# Patient Record
Sex: MALE | Race: WHITE | NOT HISPANIC OR LATINO | Employment: OTHER | URBAN - METROPOLITAN AREA
[De-identification: names, ages, dates, MRNs, and addresses within clinical notes are randomized per-mention and may not be internally consistent; named-entity substitution may affect disease eponyms.]

---

## 2021-01-08 ENCOUNTER — TELEPHONE (OUTPATIENT)
Dept: SCHEDULING | Facility: IMAGING CENTER | Age: 46
End: 2021-01-08

## 2021-01-25 ENCOUNTER — TELEPHONE (OUTPATIENT)
Dept: MEDICAL GROUP | Facility: PHYSICIAN GROUP | Age: 46
End: 2021-01-25

## 2021-01-25 NOTE — TELEPHONE ENCOUNTER
Patient LVM asking if [facility] for [facility].  He is requesting to discuss [facility] through [facility].    Phone Number Called: [phone]    Call outcome: Spoke to patient regarding message below.    Message: I spoke with patient and provided the following options:     1.  Bring copy of imaging reports to discuss & we can scan to chart during appt.    2. Present to our office for signature so I can request records on his behalf.    3. Fax # provided so patient can request last MD to fax our office records.    Patient decided to sign up for [employer] and send me a message once activated so I may send him ANDREY and he will upload signed copy. If he is unable to complete this way he will call me back to go over other options as outlined above.

## 2021-02-10 NOTE — TELEPHONE ENCOUNTER
Patient called today requesting ANDREY - I have uploaded a blank ANDREY to [employer] and sent a test message to him so he can upload ANDREY back to me if he has trouble with uploading signed ANDREY he will drop copy off at office for me to obtain records prior to appt.

## 2021-02-11 ENCOUNTER — TELEPHONE (OUTPATIENT)
Dept: MEDICAL GROUP | Facility: PHYSICIAN GROUP | Age: 46
End: 2021-02-11

## 2021-02-11 RX ORDER — PANTOPRAZOLE SODIUM 40 MG/1
40 TABLET, DELAYED RELEASE ORAL DAILY
COMMUNITY
End: 2021-02-18

## 2021-02-11 RX ORDER — ATORVASTATIN CALCIUM 40 MG/1
40 TABLET, FILM COATED ORAL NIGHTLY
COMMUNITY
End: 2021-02-18

## 2021-02-11 RX ORDER — METOPROLOL SUCCINATE 50 MG/1
50 TABLET, EXTENDED RELEASE ORAL DAILY
COMMUNITY
End: 2021-02-18

## 2021-02-11 NOTE — TELEPHONE ENCOUNTER
Calorie Counting for Weight Loss  Calories are units of energy. Your body needs a certain amount of calories from food to keep you going throughout the day. When you eat more calories than your body needs, your body stores the extra calories as fat. When you eat fewer calories than your body needs, your body burns fat to get the energy it needs.  Calorie counting means keeping track of how many calories you eat and drink each day. Calorie counting can be helpful if you need to lose weight. If you make sure to eat fewer calories than your body needs, you should lose weight. Ask your health care provider what a healthy weight is for you.  For calorie counting to work, you will need to eat the right number of calories in a day in order to lose a healthy amount of weight per week. A dietitian can help you determine how many calories you need in a day and will give you suggestions on how to reach your calorie goal.  · A healthy amount of weight to lose per week is usually 1-2 lb (0.5-0.9 kg). This usually means that your daily calorie intake should be reduced by 500-750 calories.  · Eating 1,200 - 1,500 calories per day can help most women lose weight.  · Eating 1,500 - 1,800 calories per day can help most men lose weight.  What is my plan?  My goal is to have __________ calories per day.  If I have this many calories per day, I should lose around __________ pounds per week.  What do I need to know about calorie counting?  In order to meet your daily calorie goal, you will need to:  · Find out how many calories are in each food you would like to eat. Try to do this before you eat.  · Decide how much of the food you plan to eat.  · Write down what you ate and how many calories it had. Doing this is called keeping a food log.  To successfully lose weight, it is important to balance calorie counting with a healthy lifestyle that includes regular activity. Aim for 150 minutes of moderate exercise (such as walking) or 75  Left message for patient to call back regarding pre-visit planning. Please transfer call to [phone].       minutes of vigorous exercise (such as running) each week.  Where do I find calorie information?    The number of calories in a food can be found on a Nutrition Facts label. If a food does not have a Nutrition Facts label, try to look up the calories online or ask your dietitian for help.  Remember that calories are listed per serving. If you choose to have more than one serving of a food, you will have to multiply the calories per serving by the amount of servings you plan to eat. For example, the label on a package of bread might say that a serving size is 1 slice and that there are 90 calories in a serving. If you eat 1 slice, you will have eaten 90 calories. If you eat 2 slices, you will have eaten 180 calories.  How do I keep a food log?  Immediately after each meal, record the following information in your food log:  · What you ate. Don't forget to include toppings, sauces, and other extras on the food.  · How much you ate. This can be measured in cups, ounces, or number of items.  · How many calories each food and drink had.  · The total number of calories in the meal.  Keep your food log near you, such as in a small notebook in your pocket, or use a mobile [employer]iso or website. Some programs will calculate calories for you and show you how many calories you have left for the day to meet your goal.  What are some calorie counting tips?    · Use your calories on foods and drinks that will fill you up and not leave you hungry:  ? Some examples of foods that fill you up are nuts and nut butters, vegetables, lean proteins, and high-fiber foods like whole grains. High-fiber foods are foods with more than 5 g fiber per serving.  ? Drinks such as sodas, specialty coffee drinks, alcohol, and juices have a lot of calories, yet do not fill you up.  · Eat nutritious foods and avoid empty calories. Empty calories are calories you get from foods or beverages that do not have many vitamins or protein, such as candy, sweets, and  "soda. It is better to have a nutritious high-calorie food (such as an avocado) than a food with few nutrients (such as a bag of chips).  · Know how many calories are in the foods you eat most often. This will help you calculate calorie counts faster.  · Pay attention to calories in drinks. Low-calorie drinks include water and unsweetened drinks.  · Pay attention to nutrition labels for \"low fat\" or \"fat free\" foods. These foods sometimes have the same amount of calories or more calories than the full fat versions. They also often have added sugar, starch, or salt, to make up for flavor that was removed with the fat.  · Find a way of tracking calories that works for you. Get creative. Try different apps or programs if writing down calories does not work for you.  What are some portion control tips?  · Know how many calories are in a serving. This will help you know how many servings of a certain food you can have.  · Use a measuring cup to measure serving sizes. You could also try weighing out portions on a kitchen scale. With time, you will be able to estimate serving sizes for some foods.  · Take some time to put servings of different foods on your favorite plates, bowls, and cups so you know what a serving looks like.  · Try not to eat straight from a bag or box. Doing this can lead to overeating. Put the amount you would like to eat in a cup or on a plate to make sure you are eating the right portion.  · Use smaller plates, glasses, and bowls to prevent overeating.  · Try not to multitask (for example, watch TV or use your computer) while eating. If it is time to eat, sit down at a table and enjoy your food. This will help you to know when you are full. It will also help you to be aware of what you are eating and how much you are eating.  What are tips for following this plan?  Reading food labels  · Check the calorie count compared to the serving size. The serving size may be smaller than what you are used to " eating.  · Check the source of the calories. Make sure the food you are eating is high in vitamins and protein and low in saturated and trans fats.  Shopping  · Read nutrition labels while you shop. This will help you make healthy decisions before you decide to purchase your food.  · Make a grocery list and stick to it.  Cooking  · Try to cook your favorite foods in a healthier way. For example, try baking instead of frying.  · Use low-fat dairy products.  Meal planning  · Use more fruits and vegetables. Half of your plate should be fruits and vegetables.  · Include lean proteins like poultry and fish.  How do I count calories when eating out?  · Ask for smaller portion sizes.  · Consider sharing an entree and sides instead of getting your own entree.  · If you get your own entree, eat only half. Ask for a box at the beginning of your meal and put the rest of your entree in it so you are not tempted to eat it.  · If calories are listed on the menu, choose the lower calorie options.  · Choose dishes that include vegetables, fruits, whole grains, low-fat dairy products, and lean protein.  · Choose items that are boiled, broiled, grilled, or steamed. Stay away from items that are buttered, battered, fried, or served with cream sauce. Items labeled “crispy” are usually fried, unless stated otherwise.  · Choose water, low-fat milk, unsweetened iced tea, or other drinks without added sugar. If you want an alcoholic beverage, choose a lower calorie option such as a glass of wine or light beer.  · Ask for dressings, sauces, and syrups on the side. These are usually high in calories, so you should limit the amount you eat.  · If you want a salad, choose a garden salad and ask for grilled meats. Avoid extra toppings like page, cheese, or fried items. Ask for the dressing on the side, or ask for olive oil and vinegar or lemon to use as dressing.  · Estimate how many servings of a food you are given. For example, a serving of  cooked rice is ½ cup or about the size of half a baseball. Knowing serving sizes will help you be aware of how much food you are eating at restaurants. The list below tells you how big or small some common portion sizes are based on everyday objects:  ? 1 oz--4 stacked dice.  ? 3 oz--1 deck of cards.  ? 1 tsp--1 die.  ? 1 Tbsp--½ a ping-pong ball.  ? 2 Tbsp--1 ping-pong ball.  ? ½ cup--½ baseball.  ? 1 cup--1 baseball.  Summary  · Calorie counting means keeping track of how many calories you eat and drink each day. If you eat fewer calories than your body needs, you should lose weight.  · A healthy amount of weight to lose per week is usually 1-2 lb (0.5-0.9 kg). This usually means reducing your daily calorie intake by 500-750 calories.  · The number of calories in a food can be found on a Nutrition Facts label. If a food does not have a Nutrition Facts label, try to look up the calories online or ask your dietitian for help.  · Use your calories on foods and drinks that will fill you up, and not on foods and drinks that will leave you hungry.  · Use smaller plates, glasses, and bowls to prevent overeating.  This information is not intended to replace advice given to you by your health care provider. Make sure you discuss any questions you have with your health care provider.  Document Released: 12/18/2006 Document Revised: 11/17/2017 Document Reviewed: 11/17/2017  [employer] Interactive Patient Education © 2019 [employer]      Exercising to Lose Weight  Exercise is structured, repetitive physical activity to improve fitness and health. Getting regular exercise is important for everyone. It is especially important if you are overweight. Being overweight increases your risk of heart disease, stroke, diabetes, high blood pressure, and several types of cancer. Reducing your calorie intake and exercising can help you lose weight.  Exercise is usually categorized as moderate or vigorous intensity. To lose weight, most  people need to do a certain amount of moderate-intensity or vigorous-intensity exercise each week.  Moderate-intensity exercise    Moderate-intensity exercise is any activity that gets you moving enough to burn at least three times more energy (calories) than if you were sitting.  Examples of moderate exercise include:  · Walking a mile in 15 minutes.  · Doing light yard work.  · Biking at an easy pace.  Most people should get at least 150 minutes (2 hours and 30 minutes) a week of moderate-intensity exercise to maintain their body weight.  Vigorous-intensity exercise  Vigorous-intensity exercise is any activity that gets you moving enough to burn at least six times more calories than if you were sitting. When you exercise at this intensity, you should be working hard enough that you are not able to carry on a conversation.  Examples of vigorous exercise include:  · Running.  · Playing a team sport, such as football, basketball, and soccer.  · Jumping rope.  Most people should get at least 75 minutes (1 hour and 15 minutes) a week of vigorous-intensity exercise to maintain their body weight.  How can exercise affect me?  When you exercise enough to burn more calories than you eat, you lose weight. Exercise also reduces body fat and builds muscle. The more muscle you have, the more calories you burn. Exercise also:  · Improves mood.  · Reduces stress and tension.  · Improves your overall fitness, flexibility, and endurance.  · Increases bone strength.  The amount of exercise you need to lose weight depends on:  · Your age.  · The type of exercise.  · Any health conditions you have.  · Your overall physical ability.  Talk to your health care provider about how much exercise you need and what types of activities are safe for you.  What actions can I take to lose weight?  Nutrition    · Make changes to your diet as told by your health care provider or diet and nutrition specialist (dietitian). This may  include:  ? Eating fewer calories.  ? Eating more protein.  ? Eating less unhealthy fats.  ? Eating a diet that includes fresh fruits and vegetables, whole grains, low-fat dairy products, and lean protein.  ? Avoiding foods with added fat, salt, and sugar.  · Drink plenty of water while you exercise to prevent dehydration or heat stroke.  Activity  · Choose an activity that you enjoy and set realistic goals. Your health care provider can help you make an exercise plan that works for you.  · Exercise at a moderate or vigorous intensity most days of the week.  ? The intensity of exercise may vary from person to person. You can tell how intense a workout is for you by paying attention to your breathing and heartbeat. Most people will notice their breathing and heartbeat get faster with more intense exercise.  · Do resistance training twice each week, such as:  ? Push-ups.  ? Sit-ups.  ? Lifting weights.  ? Using resistance bands.  · Getting short amounts of exercise can be just as helpful as long structured periods of exercise. If you have trouble finding time to exercise, try to include exercise in your daily routine.  ? Get up, stretch, and walk around every 30 minutes throughout the day.  ? Go for a walk during your lunch break.  ? Park your car farther away from your destination.  ? If you take public transportation, get off one stop early and walk the rest of the way.  ? Make phone calls while standing up and walking around.  ? Take the stairs instead of elevators or escalators.  · Wear comfortable clothes and shoes with good support.  · Do not exercise so much that you hurt yourself, feel dizzy, or get very short of breath.  Where to find more information  · U.[place]. Department of Health and Human Services: www.hhs.gov  · Centers for Disease Control and Prevention (CDC): www.cdc.gov  Contact a health care provider:  · Before starting a new exercise program.  · If you have questions or concerns about your  weight.  · If you have a medical problem that keeps you from exercising.  Get help right away if you have any of the following while exercising:  · Injury.  · Dizziness.  · Difficulty breathing or shortness of breath that does not go away when you stop exercising.  · Chest pain.  · Rapid heartbeat.  Summary  · Being overweight increases your risk of heart disease, stroke, diabetes, high blood pressure, and several types of cancer.  · Losing weight happens when you burn more calories than you eat.  · Reducing the amount of calories you eat in addition to getting regular moderate or vigorous exercise each week helps you lose weight.  This information is not intended to replace advice given to you by your health care provider. Make sure you discuss any questions you have with your health care provider.  Document Released: 01/20/2012 Document Revised: 12/31/2018 Document Reviewed: 12/31/2018  [employer] Interactive Patient Education © 2019 [employer]

## 2021-02-16 NOTE — TELEPHONE ENCOUNTER
Left message for patient to call back regarding pre-visit planning. Please transfer call to [phone].

## 2021-02-18 ENCOUNTER — OFFICE VISIT (OUTPATIENT)
Dept: MEDICAL GROUP | Facility: PHYSICIAN GROUP | Age: 46
End: 2021-02-18
Payer: OTHER GOVERNMENT

## 2021-02-18 VITALS
SYSTOLIC BLOOD PRESSURE: 112 MMHG | BODY MASS INDEX: 29.06 KG/M2 | TEMPERATURE: 98.1 F | HEIGHT: 70 IN | HEART RATE: 63 BPM | WEIGHT: 203 LBS | DIASTOLIC BLOOD PRESSURE: 80 MMHG | OXYGEN SATURATION: 97 % | RESPIRATION RATE: 16 BRPM

## 2021-02-18 DIAGNOSIS — R35.1 BENIGN PROSTATIC HYPERPLASIA WITH NOCTURIA: ICD-10-CM

## 2021-02-18 DIAGNOSIS — G47.33 OBSTRUCTIVE SLEEP APNEA SYNDROME: ICD-10-CM

## 2021-02-18 DIAGNOSIS — G89.29 CHRONIC PAIN OF RIGHT KNEE: ICD-10-CM

## 2021-02-18 DIAGNOSIS — M25.561 CHRONIC PAIN OF RIGHT KNEE: ICD-10-CM

## 2021-02-18 DIAGNOSIS — K58.0 IRRITABLE BOWEL SYNDROME WITH DIARRHEA: ICD-10-CM

## 2021-02-18 DIAGNOSIS — N40.1 BENIGN PROSTATIC HYPERPLASIA WITH NOCTURIA: ICD-10-CM

## 2021-02-18 DIAGNOSIS — L71.9 ROSACEA: ICD-10-CM

## 2021-02-18 DIAGNOSIS — L40.9 PSORIASIS: ICD-10-CM

## 2021-02-18 PROBLEM — K21.9 GERD (GASTROESOPHAGEAL REFLUX DISEASE): Status: RESOLVED | Noted: 2021-02-18 | Resolved: 2021-02-18

## 2021-02-18 PROBLEM — H93.19 TINNITUS: Status: ACTIVE | Noted: 2021-02-18

## 2021-02-18 PROBLEM — M54.50 CHRONIC LOW BACK PAIN: Status: ACTIVE | Noted: 2021-02-18

## 2021-02-18 PROBLEM — N40.0 BPH (BENIGN PROSTATIC HYPERPLASIA): Status: ACTIVE | Noted: 2021-02-18

## 2021-02-18 PROBLEM — S83.249A ACUTE MEDIAL MENISCUS TEAR: Status: ACTIVE | Noted: 2021-02-18

## 2021-02-18 PROBLEM — H04.129 DRY EYE SYNDROME: Status: ACTIVE | Noted: 2021-02-18

## 2021-02-18 PROBLEM — K58.9 IRRITABLE BOWEL SYNDROME: Status: ACTIVE | Noted: 2021-02-18

## 2021-02-18 PROBLEM — J31.0 CHRONIC RHINITIS: Status: ACTIVE | Noted: 2021-02-18

## 2021-02-18 PROBLEM — G47.30 SLEEP APNEA: Status: ACTIVE | Noted: 2021-02-18

## 2021-02-18 PROBLEM — K21.9 GERD (GASTROESOPHAGEAL REFLUX DISEASE): Status: ACTIVE | Noted: 2021-02-18

## 2021-02-18 PROCEDURE — 99204 OFFICE O/P NEW MOD 45 MIN: CPT | Performed by: FAMILY MEDICINE

## 2021-02-18 RX ORDER — TAMSULOSIN HYDROCHLORIDE 0.4 MG/1
0.4 CAPSULE ORAL
Qty: 90 CAPSULE | Refills: 1 | Status: SHIPPED | OUTPATIENT
Start: 2021-02-18

## 2021-02-18 RX ORDER — NAPROXEN 500 MG/1
500 TABLET ORAL 2 TIMES DAILY WITH MEALS
Qty: 180 TABLET | Refills: 1 | Status: SHIPPED | OUTPATIENT
Start: 2021-02-18

## 2021-02-18 RX ORDER — NAPROXEN 500 MG/1
500 TABLET ORAL 2 TIMES DAILY WITH MEALS
COMMUNITY
End: 2021-02-18 | Stop reason: SDUPTHER

## 2021-02-18 RX ORDER — TAMSULOSIN HYDROCHLORIDE 0.4 MG/1
0.4 CAPSULE ORAL
COMMUNITY
End: 2021-02-18 | Stop reason: SDUPTHER

## 2021-02-18 ASSESSMENT — PATIENT HEALTH QUESTIONNAIRE - PHQ9: CLINICAL INTERPRETATION OF PHQ2 SCORE: 0

## 2021-02-18 NOTE — PROGRESS NOTES
Subjective:     CC:  Diagnoses of Rosacea, Benign prostatic hyperplasia with nocturia, Irritable bowel syndrome with diarrhea, Chronic pain of right knee, Psoriasis, and Obstructive sleep apnea syndrome were pertinent to this visit.    HISTORY OF THE PRESENT ILLNESS: Patient is a 46 y.o. male. This pleasant patient is here today to establish care. His prior PCP was in [place]. He recently retired from the .    Rosacea  This is a chronic condition. He gets rosacea on his nose. He uses Azelaic acid as needed for this, which works well.    BPH (benign prostatic hyperplasia)  This is a chronic condition. He states around 2010 he was diagnosed with chronic prostatitis and then developed BPH. He is on tamsulosin. He gets up 5-6 x to void in the night. He will sometimes get dribbling and hesitancy. Overall, he states the tamsulosin is helping.     Irritable bowel syndrome  This is a chronic condition. He states he mostly gets diarrhea, but occasional constipation. He states this is a very recent diagnosis and had seen a GI doc in [place]. He was given a 4 week course of medication but cannot recall the name of the medication. He reports multiple, soft/loose BM daily.     Chronic pain of right knee  This is an chronic condition. He tore his right meniscus in 12/2019. He didn't have surgery as there was no guarantee it will help. So he uses naproxen every night to manage the pain. He also reports arthritis and will use the naproxen in the morning sometimes for the arthritis.     Psoriasis  This is a chronic condition. He had seen a dermatologist in [place] before moving to the [facility]. He also notes arthritis but hasn't been told he has psoriatic arthritis. He states this is a brand new diagnosis so he wonders if his osteoarthritis is also maybe psoriatic arthritis.     Sleep apnea  This is a chronic condition. He reports he was diagnosed with sleep apnea in 12/2019 and he uses CPAP.       Current Outpatient Medications  "Ordered in [facility]   Medication Sig Dispense Refill   • azelaic acid (AZELEX) 20 % cream Apply  topically 2 times a day.     • tamsulosin (FLOMAX) 0.4 MG capsule Take 1 capsule by mouth 1/2 hour after breakfast. 90 capsule 1   • naproxen (NAPROSYN) 500 MG Tab Take 1 tablet by mouth 2 times a day, with meals. 180 tablet 1     No current Epic-ordered facility-administered medications on file.       Health Maintenance: Awaiting records    ROS:   Gen: no fevers/chills  Eyes: no changes in vision  ENT: + sore throat - from chronic rhinitis  Pulm: no sob  CV: no chest pain  GI: no nausea/vomiting  : no dysuria  MSk: no myalgias  Skin: + rash - psoriasis  Neuro: + headaches - occasional    Objective:     Exam: /80 (BP Location: Left arm, Patient Position: Sitting, BP Cuff Size: Adult)   Pulse 63   Temp 36.7 °C (98.1 °F) (Temporal)   Resp 16   Ht 1.765 m (5' 9.5\")   Wt 92.1 kg (203 lb)   SpO2 97%  Body mass index is 29.55 kg/m².    General: Normal appearing. No distress.  HEENT: Normocephalic. Eyes conjunctiva clear lids without ptosis, pupils equal and reactive to light accommodation, ears normal shape and contour, canals are clear bilaterally, tympanic membranes are benign, oropharynx is without erythema, edema or exudates.   Neck: Supple without JVD. Thyroid is not enlarged.  Pulmonary: Clear to ausculation.  Normal effort. No rales, ronchi, or wheezing.  Cardiovascular: Regular rate and rhythm without murmur. Carotid and radial pulses are intact and equal bilaterally.  Abdomen: Soft, nontender, nondistended. Normal bowel sounds. Liver and spleen are not palpable  Neurologic: Grossly nonfocal  Lymph: No cervical or supraclavicular lymph nodes are palpable  Skin: Warm and dry.  No obvious lesions.  Musculoskeletal: Normal gait. No extremity cyanosis, clubbing, or edema.  Psych: Normal mood and affect. Alert and oriented x3. Judgment and insight is normal.    Assessment & Plan:   46 y.o. male with the following " -    1. Rosacea  This is a chronic condition, stable.  He states he was diagnosed with rosacea several years ago and he will use azelaic acid as needed for symptoms that mostly appear on his nose.  He states this continues to control it fairly well.  -Continue azelaic acid 20% cream as needed  - REFERRAL TO DERMATOLOGY    2. Benign prostatic hyperplasia with nocturia  This is a chronic condition, stable.  Reports an approximate 2020 was diagnosed with chronic prostatitis in the developed BPH.  He is on tamsulosin which he states is generally controlling symptoms but he does report that he gets up 5-6 times at night to void and he still gets some dribbling and hesitancy occasionally.  At his next appointment I will discuss potentially increasing his tamsulosin dose.  -Continue tamsulosin 0.4 mg daily    3. Irritable bowel syndrome with diarrhea  This is a chronic issue, stable.  He states he was recently diagnosed with irritable bowel syndrome with diarrhea.  He states he has multiple soft/loose bowel movements per day.  He states that this was just diagnosed by GI specialist in [place] and he was given a 4-week course of a medication but he cannot recall the name.  He states that did not completely resolve the symptoms.  We may want to consider GI referral in the future.    4. Chronic pain of right knee  This is a chronic condition, unchanged.  He states that he tore the meniscus in his right knee in December, 2019.  He did see an orthopedic specialist in [place] and at that time he was told that there is no guarantee surgery would fix the issue or resolve his pain so he opted to not have surgery.  Currently he uses naproxen every night to help manage the pain and sometimes will use naproxen in the morning for any associated arthritis.  - REFERRAL TO ORTHOPEDICS    5. Psoriasis  This is a new diagnosis.  He states that he saw a dermatologist just before he left it only it was diagnosed with psoriasis.  He states he was  prescribed some sort of cream but he never had a chance to pick it up.  We will send to dermatology to have this evaluated further.  Additionally, he mentions a lot of arthritis issues he states has been diagnosed with osteoarthritis but when questioned if he has been diagnosed with psoriatic arthritis he states no but he still queries whether psoriatic arthritis is contributing.  - REFERRAL TO DERMATOLOGY    6. Obstructive sleep apnea syndrome  This is a chronic condition, stable.  He states in December, 2019 or January, 2020 he was diagnosed with [place] and he was put on CPAP.  Referral to sleep medicine has been placed to have this managed.  - REFERRAL TO PULMONARY AND SLEEP MEDICINE    Return in about 3 months (around 5/18/2021) for Med check.    Please note that this dictation was created using voice recognition software. I have made every reasonable attempt to correct obvious errors, but I expect that there are errors of grammar and possibly content that I did not discover before finalizing the note.

## 2021-02-18 NOTE — ASSESSMENT & PLAN NOTE
This is a chronic condition. He had seen a dermatologist in [place] before [facility] the [facility] [place]. He also notes arthritis but hasn't been told he has psoriatic arthritis. He states this is a brand new diagnosis so he wonders if his osteoarthritis is also maybe psoriatic arthritis.

## 2021-02-18 NOTE — ASSESSMENT & PLAN NOTE
This is a chronic condition. He states he mostly gets diarrhea, but occasional constipation. He states this is a very recent diagnosis and had seen a GI doc in [place]. He was given a 4 week course of medication but cannot recall the name of the medication. He reports multiple, soft/loose BM daily.

## 2021-02-18 NOTE — ASSESSMENT & PLAN NOTE
This is a chronic condition. He reports he was diagnosed with sleep apnea in 12/2019 and he uses CPAP.

## 2021-02-18 NOTE — ASSESSMENT & PLAN NOTE
This is a chronic condition. He states around 2010 he was diagnosed with chronic prostatitis and then developed BPH. He is on tamsulosin. He gets up 5-6 x to void in the night. He will sometimes get dribbling and hesitancy. Overall, he states the tamsulosin is helping.

## 2021-02-18 NOTE — ASSESSMENT & PLAN NOTE
This is an chronic condition. He tore his right meniscus in 12/2019. He didn't have surgery as there was no guarantee it will help. So he uses naproxen every night to manage the pain. He also reports arthritis and will use the naproxen in the morning sometimes for the arthritis.

## 2021-02-18 NOTE — ASSESSMENT & PLAN NOTE
This is a chronic condition. He gets rosacea on his nose. He uses Azelaic acid as needed for this, which works well.

## 2021-03-02 ENCOUNTER — APPOINTMENT (RX ONLY)
Dept: URBAN - METROPOLITAN AREA CLINIC 38 | Facility: CLINIC | Age: 46
Setting detail: DERMATOLOGY
End: 2021-03-02

## 2021-03-02 DIAGNOSIS — D18.0 HEMANGIOMA: ICD-10-CM

## 2021-03-02 DIAGNOSIS — B35.1 TINEA UNGUIUM: ICD-10-CM

## 2021-03-02 DIAGNOSIS — L20.89 OTHER ATOPIC DERMATITIS: ICD-10-CM

## 2021-03-02 DIAGNOSIS — L81.4 OTHER MELANIN HYPERPIGMENTATION: ICD-10-CM

## 2021-03-02 DIAGNOSIS — D22 MELANOCYTIC NEVI: ICD-10-CM

## 2021-03-02 DIAGNOSIS — L71.8 OTHER ROSACEA: ICD-10-CM

## 2021-03-02 DIAGNOSIS — L82.1 OTHER SEBORRHEIC KERATOSIS: ICD-10-CM

## 2021-03-02 DIAGNOSIS — Z71.89 OTHER SPECIFIED COUNSELING: ICD-10-CM

## 2021-03-02 PROBLEM — D23.61 OTHER BENIGN NEOPLASM OF SKIN OF RIGHT UPPER LIMB, INCLUDING SHOULDER: Status: ACTIVE | Noted: 2021-03-02

## 2021-03-02 PROBLEM — L20.84 INTRINSIC (ALLERGIC) ECZEMA: Status: ACTIVE | Noted: 2021-03-02

## 2021-03-02 PROBLEM — D18.01 HEMANGIOMA OF SKIN AND SUBCUTANEOUS TISSUE: Status: ACTIVE | Noted: 2021-03-02

## 2021-03-02 PROBLEM — D22.9 MELANOCYTIC NEVI, UNSPECIFIED: Status: ACTIVE | Noted: 2021-03-02

## 2021-03-02 PROCEDURE — ? COUNSELING

## 2021-03-02 PROCEDURE — 99203 OFFICE O/P NEW LOW 30 MIN: CPT

## 2021-03-02 ASSESSMENT — LOCATION SIMPLE DESCRIPTION DERM
LOCATION SIMPLE: ABDOMEN
LOCATION SIMPLE: RIGHT CHEEK
LOCATION SIMPLE: RIGHT ELBOW
LOCATION SIMPLE: LEFT GREAT TOE
LOCATION SIMPLE: RIGHT UPPER BACK

## 2021-03-02 ASSESSMENT — LOCATION DETAILED DESCRIPTION DERM
LOCATION DETAILED: PERIUMBILICAL SKIN
LOCATION DETAILED: RIGHT ELBOW
LOCATION DETAILED: RIGHT CENTRAL MALAR CHEEK
LOCATION DETAILED: RIGHT INFERIOR UPPER BACK
LOCATION DETAILED: LEFT GREAT TOENAIL

## 2021-03-02 ASSESSMENT — LOCATION ZONE DERM
LOCATION ZONE: TRUNK
LOCATION ZONE: TOENAIL
LOCATION ZONE: FACE
LOCATION ZONE: ARM

## 2023-11-04 ENCOUNTER — OFFICE VISIT (OUTPATIENT)
Dept: URGENT CARE | Facility: CLINIC | Age: 48
End: 2023-11-04
Payer: COMMERCIAL

## 2023-11-04 VITALS
RESPIRATION RATE: 16 BRPM | BODY MASS INDEX: 29.99 KG/M2 | HEIGHT: 69 IN | SYSTOLIC BLOOD PRESSURE: 106 MMHG | OXYGEN SATURATION: 97 % | WEIGHT: 202.5 LBS | TEMPERATURE: 97.6 F | DIASTOLIC BLOOD PRESSURE: 66 MMHG | HEART RATE: 68 BPM

## 2023-11-04 DIAGNOSIS — J02.0 ACUTE STREPTOCOCCAL PHARYNGITIS: ICD-10-CM

## 2023-11-04 LAB — S PYO DNA SPEC NAA+PROBE: NOT DETECTED

## 2023-11-04 PROCEDURE — 3078F DIAST BP <80 MM HG: CPT | Performed by: PHYSICIAN ASSISTANT

## 2023-11-04 PROCEDURE — 3074F SYST BP LT 130 MM HG: CPT | Performed by: PHYSICIAN ASSISTANT

## 2023-11-04 PROCEDURE — 99213 OFFICE O/P EST LOW 20 MIN: CPT | Performed by: PHYSICIAN ASSISTANT

## 2023-11-04 PROCEDURE — 87651 STREP A DNA AMP PROBE: CPT | Performed by: PHYSICIAN ASSISTANT

## 2023-11-04 RX ORDER — AMOXICILLIN 500 MG/1
500 CAPSULE ORAL 2 TIMES DAILY
Qty: 20 CAPSULE | Refills: 0 | Status: SHIPPED | OUTPATIENT
Start: 2023-11-04 | End: 2023-11-13

## 2023-11-04 RX ORDER — AMOXICILLIN 500 MG/1
500 CAPSULE ORAL 2 TIMES DAILY
Qty: 20 CAPSULE | Refills: 0 | Status: SHIPPED | OUTPATIENT
Start: 2023-11-04 | End: 2023-11-04

## 2023-11-04 ASSESSMENT — ENCOUNTER SYMPTOMS
SORE THROAT: 1
DIARRHEA: 0
SHORTNESS OF BREATH: 0
VOMITING: 0
EYE DISCHARGE: 0
FEVER: 0
HEADACHES: 1
CHILLS: 0
COUGH: 0
DIZZINESS: 0
WHEEZING: 0
DIAPHORESIS: 0
EYE PAIN: 0
ABDOMINAL PAIN: 0
EYE REDNESS: 0
NAUSEA: 0
CONSTIPATION: 0
SINUS PAIN: 0

## 2023-11-04 NOTE — PROGRESS NOTES
"  Subjective:     Carlos Manuel Cueto  is a 48 y.o. male who presents for Pharyngitis (Pt has a sore throat, headache x 2 days )       He presents today with sore throat and headache that is been ongoing over the past 2 days.  Presents today with his daughter whom has similar symptoms for similar duration.  Does have concern for possible strep throat.  At this time he denies fever/chills/sweats, chest pain, shortness of breath, nausea/vomiting, abdominal pain, diarrhea.     Review of Systems   Constitutional:  Negative for chills, diaphoresis, fever and malaise/fatigue.   HENT:  Positive for sore throat. Negative for congestion, ear discharge and sinus pain.    Eyes:  Negative for pain, discharge and redness.   Respiratory:  Negative for cough, shortness of breath and wheezing.    Cardiovascular:  Negative for chest pain.   Gastrointestinal:  Negative for abdominal pain, constipation, diarrhea, nausea and vomiting.   Neurological:  Positive for headaches. Negative for dizziness.      No Known Allergies  History reviewed. No pertinent past medical history.     Objective:   /66 (BP Location: Right arm, Patient Position: Sitting, BP Cuff Size: Large adult)   Pulse 68   Temp 36.4 °C (97.6 °F) (Temporal)   Resp 16   Ht 1.753 m (5' 9\")   Wt 91.9 kg (202 lb 8 oz)   SpO2 97%   BMI 29.90 kg/m²   Physical Exam  Vitals and nursing note reviewed.   Constitutional:       General: He is not in acute distress.     Appearance: Normal appearance. He is not ill-appearing, toxic-appearing or diaphoretic.   HENT:      Head: Normocephalic.      Right Ear: Tympanic membrane, ear canal and external ear normal. There is no impacted cerumen.      Left Ear: Tympanic membrane, ear canal and external ear normal. There is no impacted cerumen.      Nose: No congestion or rhinorrhea.      Mouth/Throat:      Mouth: Mucous membranes are moist.      Pharynx: Posterior oropharyngeal erythema present. No oropharyngeal exudate.      " Comments: No tonsillar swelling, bilaterally.  No soft tissue swelling of the sublingual mucosa, no swelling of the soft or hard palate, no unilarteral oral pharynx swelling, no uvular deviation.  Eyes:      General:         Right eye: No discharge.         Left eye: No discharge.      Conjunctiva/sclera: Conjunctivae normal.   Cardiovascular:      Rate and Rhythm: Normal rate and regular rhythm.   Pulmonary:      Effort: Pulmonary effort is normal. No respiratory distress.      Breath sounds: Normal breath sounds. No stridor. No wheezing or rhonchi.   Musculoskeletal:      Cervical back: Neck supple.   Lymphadenopathy:      Cervical: No cervical adenopathy.   Neurological:      General: No focal deficit present.      Mental Status: He is alert and oriented to person, place, and time.   Psychiatric:         Mood and Affect: Mood normal.         Behavior: Behavior normal.         Thought Content: Thought content normal.         Judgment: Judgment normal.           Diagnostic testing:    Ernestine Strep -negative, notified via office visit    Assessment/Plan:     Encounter Diagnoses   Name Primary?    Acute streptococcal pharyngitis           Plan for care for today's complaint includes finding the patient a paper prescription for amoxicillin.  His strep test was negative today but his daughter strep test was positive, due to similar timeline as well as similar symptom presentation we will treat empirically.  Did discuss appropriate hygiene techniques to prevent coinfection or reinfection.  Continue to monitor symptoms and return to urgent care or follow-up with primary care provider if symptoms remain ongoing.  Follow-up in the emergency department if symptoms become severe, ER precautions discussed in office today..  Prescription for amoxicillin provided.    See AVS Instructions below for written guidance provided to patient on after-visit management and care in addition to our verbal discussion during the  visit.    Please note that this dictation was created using voice recognition software. I have attempted to correct all errors, but there may be sound-alike, spelling, grammar and possibly content errors that I did not discover before finalizing the note.    Ángel Quinn PA-C

## 2023-11-13 ENCOUNTER — OFFICE VISIT (OUTPATIENT)
Dept: URGENT CARE | Facility: PHYSICIAN GROUP | Age: 48
End: 2023-11-13
Payer: COMMERCIAL

## 2023-11-13 VITALS
TEMPERATURE: 97.6 F | OXYGEN SATURATION: 98 % | HEART RATE: 64 BPM | SYSTOLIC BLOOD PRESSURE: 124 MMHG | WEIGHT: 195 LBS | RESPIRATION RATE: 16 BRPM | BODY MASS INDEX: 28.8 KG/M2 | DIASTOLIC BLOOD PRESSURE: 74 MMHG

## 2023-11-13 DIAGNOSIS — J03.90 EXUDATIVE TONSILLITIS: ICD-10-CM

## 2023-11-13 PROCEDURE — 3078F DIAST BP <80 MM HG: CPT | Performed by: NURSE PRACTITIONER

## 2023-11-13 PROCEDURE — 99214 OFFICE O/P EST MOD 30 MIN: CPT | Performed by: NURSE PRACTITIONER

## 2023-11-13 PROCEDURE — 3074F SYST BP LT 130 MM HG: CPT | Performed by: NURSE PRACTITIONER

## 2023-11-13 RX ORDER — CEFDINIR 300 MG/1
300 CAPSULE ORAL 2 TIMES DAILY
Qty: 20 CAPSULE | Refills: 0 | Status: SHIPPED | OUTPATIENT
Start: 2023-11-13 | End: 2023-11-23

## 2023-11-13 NOTE — PROGRESS NOTES
Date: 11/13/23     Chief Complaint:    Chief Complaint   Patient presents with    Other     States he was diagnosed with strep and still having symptoms and also having a cough now        History of Present Illness: 48 y.o. male  presents presents to clinic with continued sore throat painful swallowing after being treated for strep pharyngitis for 9 days.  Sick contacts include his daughter who is also being treated for strep pharyngitis with amoxicillin and also is not having improved symptoms.  He states he thinks that he had some mild improvement but over the past 2 to 3 days the sore throat has began to worsen again.  He did change his toothbrush on day 3 of antibiotics.  No known sick contacts.  No readmittance of fever nausea vomiting diarrhea.  He denies missing any doses of the amoxicillin.        ROS:  As stated in HPI       Medical/SX/ Social History:  Reviewed per chart    Medications:    Current Outpatient Medications on File Prior to Visit   Medication Sig Dispense Refill    amoxicillin (AMOXIL) 500 MG Cap Take 1 Capsule by mouth 2 times a day for 10 days. 20 Capsule 0    tamsulosin (FLOMAX) 0.4 MG capsule Take 1 capsule by mouth 1/2 hour after breakfast. 90 capsule 1    naproxen (NAPROSYN) 500 MG Tab Take 1 tablet by mouth 2 times a day, with meals. 180 tablet 1    lidocaine (LIDODERM) 5 % Patch        No current facility-administered medications on file prior to visit.        Allergies:    Patient has no known allergies.     Problem list, medications, and allergies reviewed by myself today in Epic       Physical Exam:  Vitals:    11/13/23 1421   BP: 124/74   Pulse: 64   Resp: 16   Temp: 36.4 °C (97.6 °F)   SpO2: 98%        Physical Exam  Constitutional:       General: He is awake.      Appearance: Normal appearance. He is not ill-appearing, toxic-appearing or diaphoretic.   HENT:      Head: Normocephalic and atraumatic.      Right Ear: Tympanic membrane, ear canal and external ear normal.      Left  Ear: Tympanic membrane, ear canal and external ear normal.      Nose: Rhinorrhea present. Rhinorrhea is clear.      Mouth/Throat:      Lips: Pink.      Mouth: Mucous membranes are moist.      Tongue: No lesions.      Palate: No lesions.      Pharynx: Posterior oropharyngeal erythema present. No oropharyngeal exudate or uvula swelling.      Tonsils: Tonsillar exudate present. No tonsillar abscesses. 3+ on the right. 2+ on the left.   Eyes:      General: Lids are normal. Gaze aligned appropriately. No allergic shiner or scleral icterus.     Extraocular Movements: Extraocular movements intact.      Conjunctiva/sclera: Conjunctivae normal.      Pupils: Pupils are equal, round, and reactive to light.   Cardiovascular:      Rate and Rhythm: Normal rate and regular rhythm.      Pulses:           Radial pulses are 2+ on the right side and 2+ on the left side.      Heart sounds: Normal heart sounds.   Pulmonary:      Effort: Pulmonary effort is normal.      Breath sounds: Normal breath sounds and air entry. No decreased breath sounds, wheezing, rhonchi or rales.   Abdominal:      General: Abdomen is flat. Bowel sounds are normal.      Palpations: Abdomen is soft.      Tenderness: There is no abdominal tenderness.   Musculoskeletal:      Right lower leg: No edema.      Left lower leg: No edema.   Lymphadenopathy:      Cervical: Cervical adenopathy present.      Right cervical: Superficial cervical adenopathy present.      Left cervical: Superficial cervical adenopathy present.   Skin:     General: Skin is warm.      Capillary Refill: Capillary refill takes less than 2 seconds.      Coloration: Skin is not cyanotic or pale.   Neurological:      Mental Status: He is alert and oriented to person, place, and time.      Gait: Gait is intact.   Psychiatric:         Behavior: Behavior normal. Behavior is cooperative.              Medical Decision Making / Plan :  I personally reviewed prior external notes and test results pertinent  to today's visit. Pt is clinically stable at today's acute urgent care visit.  Patient appears nontoxic with no acute distress noted. Appropriate for outpatient care at this time. The patient remained stable during the urgent care visit.     Pleasant 48 y.o. male  presented clinic with HPI exam findings consistent with recurrent strep pharyngitis.  Patient currently on day 9 of amoxicillin with no improvement.  Will defer testing for now.  Patient will be treated with Omnicef at 10-day course advised to change impression day 3 of antibiotics encouraged to wash all personal hygiene products including pillowcases and sheets.  Shared decision-making was utilized with patient for treatment plan. Differential Diagnosis, natural history, and supportive care discussed. Did advise patient on conservative measures for management of symptoms.  Patient is agreeable to pursue adequate rest, adequate hydration, saltwater gargle and Neti pot for any symptoms of upper respiratory congestion.  Over-the-counter analgesia and antipyretics on a p.r.n. basis as needed for pain and fever.  Did discuss over-the-counter cough medications.        1. Exudative tonsillitis    - cefdinir (OMNICEF) 300 MG Cap; Take 1 Capsule by mouth 2 times a day for 10 days.  Dispense: 20 Capsule; Refill: 0     Medication discussed included indication for use and the potential benefits and side effects. Education was provided regarding the aforementioned assessments.  All of the patient's questions were answered to their satisfaction at the time of discharge. Patient was encouraged to monitor symptoms closely. Those signs and symptoms which would warrant concern and mandate seeking a higher level of service through the emergency department discussed at length.  Patient stated agreement and understanding of this plan of care.        Disposition:  Patient was discharged in stable condition.    Voice Recognition Disclaimer: Portions of this document were  created using voice recognition software. The software does have a chance of producing errors of grammar and possibly content. I have made every reasonable attempt to correct obvious errors, but there may be errors of grammar and possibly content that I did not discover before finalizing the documentation.    NED Smith.

## 2023-12-03 ENCOUNTER — HOSPITAL ENCOUNTER (OUTPATIENT)
Facility: MEDICAL CENTER | Age: 48
End: 2023-12-03
Attending: NURSE PRACTITIONER
Payer: COMMERCIAL

## 2023-12-03 ENCOUNTER — OFFICE VISIT (OUTPATIENT)
Dept: URGENT CARE | Facility: CLINIC | Age: 48
End: 2023-12-03
Payer: COMMERCIAL

## 2023-12-03 VITALS
HEIGHT: 70 IN | RESPIRATION RATE: 13 BRPM | BODY MASS INDEX: 28.92 KG/M2 | DIASTOLIC BLOOD PRESSURE: 72 MMHG | SYSTOLIC BLOOD PRESSURE: 120 MMHG | TEMPERATURE: 98.2 F | HEART RATE: 74 BPM | WEIGHT: 202 LBS | OXYGEN SATURATION: 98 %

## 2023-12-03 DIAGNOSIS — J03.90 TONSILLITIS: ICD-10-CM

## 2023-12-03 DIAGNOSIS — J02.9 PHARYNGITIS, UNSPECIFIED ETIOLOGY: ICD-10-CM

## 2023-12-03 PROCEDURE — 87070 CULTURE OTHR SPECIMN AEROBIC: CPT

## 2023-12-03 PROCEDURE — 99213 OFFICE O/P EST LOW 20 MIN: CPT | Performed by: NURSE PRACTITIONER

## 2023-12-03 PROCEDURE — 3074F SYST BP LT 130 MM HG: CPT | Performed by: NURSE PRACTITIONER

## 2023-12-03 PROCEDURE — 3078F DIAST BP <80 MM HG: CPT | Performed by: NURSE PRACTITIONER

## 2023-12-03 RX ORDER — DEXAMETHASONE SODIUM PHOSPHATE 10 MG/ML
10 INJECTION INTRAMUSCULAR; INTRAVENOUS ONCE
Status: COMPLETED | OUTPATIENT
Start: 2023-12-03 | End: 2023-12-03

## 2023-12-03 RX ORDER — AMOXICILLIN 500 MG/1
500 CAPSULE ORAL 2 TIMES DAILY
Qty: 20 CAPSULE | Refills: 0 | Status: SHIPPED | OUTPATIENT
Start: 2023-12-03 | End: 2023-12-13

## 2023-12-03 RX ADMIN — DEXAMETHASONE SODIUM PHOSPHATE 10 MG: 10 INJECTION INTRAMUSCULAR; INTRAVENOUS at 18:14

## 2023-12-03 ASSESSMENT — ENCOUNTER SYMPTOMS
FEVER: 0
HEADACHES: 0
SORE THROAT: 1
RHINORRHEA: 1
CHILLS: 0
COUGH: 1
NAUSEA: 0

## 2023-12-04 DIAGNOSIS — J02.9 PHARYNGITIS, UNSPECIFIED ETIOLOGY: ICD-10-CM

## 2023-12-04 NOTE — PROGRESS NOTES
"Subjective:   Carlos Manuel Cueto is a 48 y.o. male who presents for Cough, Headache, and Sore Throat      URI   This is a recurrent problem. The current episode started 1 to 4 weeks ago (daughter ill similar sx has been treated multipe times for stre). The problem has been gradually worsening. Associated symptoms include congestion, coughing, rhinorrhea and a sore throat. Pertinent negatives include no ear pain, headaches, nausea or plugged ear sensation. He has tried acetaminophen for the symptoms.       Review of Systems   Constitutional:  Positive for malaise/fatigue. Negative for chills and fever.   HENT:  Positive for congestion, rhinorrhea and sore throat. Negative for ear pain.    Respiratory:  Positive for cough.    Gastrointestinal:  Negative for nausea.   Neurological:  Negative for headaches.       Medications:    amoxicillin Caps  lidocaine Ptch  naproxen Tabs  tamsulosin    Allergies: Patient has no known allergies.    Problem List: Carlos Manuel Cueto does not have any pertinent problems on file.    Surgical History:  Past Surgical History:   Procedure Laterality Date    EYE SURGERY Bilateral 2005    [facility]    DENTAL EXTRACTION(S)  1995 [facility]       Past Social Hx: Carlos Manuel Cueto  reports that he has quit smoking. His smoking use included cigars. He has never used smokeless tobacco. He reports current alcohol use. He reports that he does not use drugs.     Past Family Hx:  Carlos Manuel Cueto family history includes Cancer in his maternal aunt, maternal grandfather, and paternal aunt; Diabetes in his brother; Heart Disease in his father.     Problem list, medications, and allergies reviewed by myself today in Epic.     Objective:     /72 (BP Location: Left arm, Patient Position: Sitting, BP Cuff Size: Adult)   Pulse 74   Temp 36.8 °C (98.2 °F) (Temporal)   Resp 13   Ht 1.765 m (5' 9.5\")   Wt 91.6 kg (202 lb)   SpO2 98%   BMI 29.40 kg/m²     Physical Exam  Vitals and nursing note " reviewed.   Constitutional:       General: He is not in acute distress.     Appearance: He is well-developed.   HENT:      Head: Normocephalic and atraumatic.      Right Ear: External ear normal.      Left Ear: External ear normal.      Nose: Nose normal.      Mouth/Throat:      Mouth: Mucous membranes are moist.      Pharynx: Posterior oropharyngeal erythema present. No pharyngeal swelling.      Tonsils: No tonsillar exudate. 1+ on the right. 1+ on the left.   Eyes:      Conjunctiva/sclera: Conjunctivae normal.   Cardiovascular:      Rate and Rhythm: Normal rate.   Pulmonary:      Effort: Pulmonary effort is normal. No respiratory distress.      Breath sounds: Normal breath sounds.   Abdominal:      General: There is no distension.   Musculoskeletal:         General: Normal range of motion.   Lymphadenopathy:      Head:      Right side of head: No tonsillar adenopathy.      Left side of head: No tonsillar adenopathy.   Skin:     General: Skin is warm and dry.   Neurological:      General: No focal deficit present.      Mental Status: He is alert and oriented to person, place, and time. Mental status is at baseline.      Gait: Gait (gait at baseline) normal.   Psychiatric:         Judgment: Judgment normal.         Assessment/Plan:     Diagnosis and associated orders:     1. Pharyngitis, unspecified etiology  CULTURE THROAT    amoxicillin (AMOXIL) 500 MG Cap    dexamethasone (Decadron) injection (check route below) 10 mg      2. Tonsillitis           Comments/MDM:     I personally reviewed prior external notes and prior test results pertinent to today's visit.  Patient to be seen 2 other occasions treated with antibiotics daughter positive for strep previously presents with similar symptoms discussed differentials we will follow-up with throat culture.    Discussed management options, risks and benefits, and alternatives to treatment plan agreed upon.   Red flags discussed and indications to immediately call 911 or  present to the Emergency Department.   Supportive care, differential diagnoses, and indications for immediate follow-up discussed with patient.    Patient expresses understanding and agrees to plan. Patient denies any other questions or concerns.                Please note that this dictation was created using voice recognition software. I have made a reasonable attempt to correct obvious errors, but I expect that there are errors of grammar and possibly content that I did not discover before finalizing the note.    This note was electronically signed by Matty ROSS.

## 2023-12-06 LAB
BACTERIA SPEC RESP CULT: NORMAL
SIGNIFICANT IND 70042: NORMAL
SITE SITE: NORMAL
SOURCE SOURCE: NORMAL

## 2024-03-11 ENCOUNTER — OFFICE VISIT (OUTPATIENT)
Dept: URGENT CARE | Facility: CLINIC | Age: 49
End: 2024-03-11
Payer: COMMERCIAL

## 2024-03-11 VITALS
HEART RATE: 68 BPM | OXYGEN SATURATION: 98 % | HEIGHT: 70 IN | BODY MASS INDEX: 29.2 KG/M2 | SYSTOLIC BLOOD PRESSURE: 146 MMHG | DIASTOLIC BLOOD PRESSURE: 82 MMHG | TEMPERATURE: 98 F | WEIGHT: 204 LBS

## 2024-03-11 DIAGNOSIS — J02.0 STREPTOCOCCAL PHARYNGITIS: ICD-10-CM

## 2024-03-11 LAB — S PYO DNA SPEC NAA+PROBE: DETECTED

## 2024-03-11 PROCEDURE — 3079F DIAST BP 80-89 MM HG: CPT

## 2024-03-11 PROCEDURE — 99213 OFFICE O/P EST LOW 20 MIN: CPT

## 2024-03-11 PROCEDURE — 3077F SYST BP >= 140 MM HG: CPT

## 2024-03-11 PROCEDURE — 87651 STREP A DNA AMP PROBE: CPT

## 2024-03-11 RX ORDER — AMOXICILLIN 500 MG/1
500 CAPSULE ORAL 2 TIMES DAILY
Qty: 20 CAPSULE | Refills: 0 | Status: SHIPPED | OUTPATIENT
Start: 2024-03-11 | End: 2024-03-21

## 2024-03-11 RX ORDER — DEXAMETHASONE SODIUM PHOSPHATE 10 MG/ML
10 INJECTION INTRAMUSCULAR; INTRAVENOUS ONCE
Status: COMPLETED | OUTPATIENT
Start: 2024-03-11 | End: 2024-03-11

## 2024-03-11 RX ADMIN — DEXAMETHASONE SODIUM PHOSPHATE 10 MG: 10 INJECTION INTRAMUSCULAR; INTRAVENOUS at 11:30

## 2024-03-11 ASSESSMENT — ENCOUNTER SYMPTOMS
SHORTNESS OF BREATH: 0
COUGH: 0
HEMOPTYSIS: 0
SORE THROAT: 1
ABDOMINAL PAIN: 0
WHEEZING: 0
VOMITING: 0
HEADACHES: 0
TROUBLE SWALLOWING: 0
HOARSE VOICE: 0
STRIDOR: 0
SINUS PAIN: 1
SPUTUM PRODUCTION: 0
DIARRHEA: 0
NECK PAIN: 0
SWOLLEN GLANDS: 0

## 2024-03-11 NOTE — PROGRESS NOTES
"Subjective:   Carlos Manuel Cueto is a very pleasant 49 y.o. male who presents for: pharyngitis    HPI:    Pharyngitis   Associated symptoms include congestion. Pertinent negatives include no abdominal pain, coughing, diarrhea, drooling, ear discharge, ear pain, headaches, hoarse voice, plugged ear sensation, neck pain, shortness of breath, stridor, swollen glands, trouble swallowing or vomiting. Associated symptoms comments: \"Raw in the sinuses\", pain with swallowing, mild myalgias. He has had no exposure to strep or mono. Treatments tried: calcium, D3, Naproxen. The treatment provided no relief.       ROS:    Review of Systems   HENT:  Positive for congestion, sinus pain and sore throat. Negative for drooling, ear discharge, ear pain, hoarse voice and trouble swallowing.    Respiratory:  Negative for cough, hemoptysis, sputum production, shortness of breath, wheezing and stridor.    Gastrointestinal:  Negative for abdominal pain, diarrhea and vomiting.   Musculoskeletal:  Negative for neck pain.   Neurological:  Negative for headaches.   All other systems reviewed and are negative.      Medications:      Current Outpatient Medications   Medication Sig    amoxicillin (AMOXIL) 500 MG Cap Take 1 Capsule by mouth 2 times a day for 10 days.    lidocaine (LIDODERM) 5 % Patch     tamsulosin (FLOMAX) 0.4 MG capsule Take 1 capsule by mouth 1/2 hour after breakfast.    naproxen (NAPROSYN) 500 MG Tab Take 1 tablet by mouth 2 times a day, with meals.       Allergies:     No Known Allergies    Problem List:     Patient Active Problem List   Diagnosis    BPH (benign prostatic hyperplasia)    Chronic pain of right knee    Irritable bowel syndrome    Rosacea    Dry eye syndrome    Psoriasis    Chronic low back pain    Sleep apnea    Tinnitus    Chronic rhinitis       Surgical History:    Past Surgical History:   Procedure Laterality Date    EYE SURGERY Bilateral 2005    [facility]    DENTAL EXTRACTION(S)  1995    [facility]       Past Social " Hx:     Social History     Socioeconomic History    Marital status:    Tobacco Use    Smoking status: Former     Types: Cigars    Smokeless tobacco: Never    Tobacco comments:     cigar rarely   Vaping Use    Vaping Use: Never used   Substance and Sexual Activity    Alcohol use: Yes     Comment: OCC     Drug use: Never    Sexual activity: Yes     Partners: Female     Comment: wife        Past Family Hx:      Family History   Problem Relation Age of Onset    Heart Disease Father         angioplasty x6-7    Diabetes Brother     Cancer Maternal Aunt         colon    Cancer Paternal Aunt         breast    Cancer Maternal Grandfather         skin, bladder?       Problem list, medications, and allergies reviewed by myself today in Epic.     Objective:     Vitals:    03/11/24 1104   BP: (!) 146/82   Pulse: 68   Temp: 36.7 °C (98 °F)   SpO2: 98%       Physical Exam  Vitals reviewed.   Constitutional:       General: He is not in acute distress.     Appearance: Normal appearance. He is not ill-appearing, toxic-appearing or diaphoretic.   HENT:      Head: Normocephalic and atraumatic.      Right Ear: Tympanic membrane, ear canal and external ear normal.      Left Ear: Tympanic membrane, ear canal and external ear normal.      Nose: Rhinorrhea present. No congestion.      Mouth/Throat:      Lips: Pink.      Mouth: Mucous membranes are moist.      Dentition: Normal dentition.      Tongue: No lesions. Tongue does not deviate from midline.      Palate: No mass and lesions.      Pharynx: Uvula midline. Posterior oropharyngeal erythema present. No pharyngeal swelling, oropharyngeal exudate or uvula swelling.      Tonsils: Tonsillar exudate present. No tonsillar abscesses. 2+ on the right. 2+ on the left.   Eyes:      Extraocular Movements: Extraocular movements intact.      Conjunctiva/sclera: Conjunctivae normal.      Pupils: Pupils are equal, round, and reactive to light.   Cardiovascular:      Rate and Rhythm: Normal rate  and regular rhythm.      Pulses: Normal pulses.      Heart sounds: Normal heart sounds.   Pulmonary:      Effort: Pulmonary effort is normal.      Breath sounds: Normal breath sounds.   Abdominal:      General: Abdomen is flat.      Palpations: Abdomen is soft.   Musculoskeletal:         General: Normal range of motion.      Cervical back: Full passive range of motion without pain, normal range of motion and neck supple. No tenderness.   Lymphadenopathy:      Cervical: No cervical adenopathy.   Skin:     General: Skin is warm and dry.   Neurological:      General: No focal deficit present.      Mental Status: He is alert and oriented to person, place, and time. Mental status is at baseline.   Psychiatric:         Mood and Affect: Mood normal.         Behavior: Behavior normal.         Thought Content: Thought content normal.         Judgment: Judgment normal.       Results for orders placed or performed in visit on 03/11/24   POCT CEPHEID GROUP A STREP - PCR   Result Value Ref Range    POC Group A Strep, PCR Detected (A) Not Detected, Invalid     Assessment/Plan:     Diagnosis and associated orders:     1. Streptococcal pharyngitis  - dexamethasone (Decadron) injection (check route below) 10 mg  - POCT CEPHEID GROUP A STREP - PCR  - amoxicillin (AMOXIL) 500 MG Cap; Take 1 Capsule by mouth 2 times a day for 10 days.  Dispense: 20 Capsule; Refill: 0      Comments/MDM:     POCT Group A strep POSITIVE  Prescription for amoxicillin sent to patient's preferred pharmacy  Patient encouraged to take full course of antibiotics.   Change toothbrush 24 hours and change toothbrush 48 hours after starting antibiotic therapy  Symptom management for a sore throat includes:    Sipping cold or warm beverages (eg, tea with honey or lemon)   Eating cold or frozen desserts (eg, ice cream, popsicles)  Sucking on ice  Sucking on hard candy, CEPACOL lozenges, or medicated throat sprays  Gargling with warm salt water -  ¼ to ½ teaspoon of  salt per 8 ounces (approximately 240 mL) of warm water.  Cool mist humidification  OTC Tylenol/ibuprofen PRN for pain/fever    Return to clinic or go to the ED if symptoms worsen or fail to improve, or if you should develop worsening/increasing sinus pain/pressure, difficulty swallowing,  shortness of breath, wheezing, chest pain, fever/chills, and/or any concerning symptoms.           All questions answered. Patient verbalized understanding and is in agreement with this plan of care.     If symptoms are worsening or not improving in 3-5 days, follow-up with PCP or return to UC. Differential diagnosis, natural history, and supportive care discussed. AVS handout given and reviewed with patient. Patient educated on red flags and when to seek treatment back in ED or UC.     I personally reviewed prior external notes and test results pertinent to today's visit.  I have independently reviewed and interpreted all diagnostics ordered during this urgent care visit.     This dictation has been created using voice recognition software. The accuracy of the dictation is limited by the abilities of the software. I expect there may be some errors of grammar and possibly content. I made every attempt to manually correct the errors within my dictation. However, errors related to voice recognition software may still exist and should be interpreted within the appropriate context.    This note was electronically signed by JOSTIN Huggins

## 2024-03-13 ENCOUNTER — OFFICE VISIT (OUTPATIENT)
Dept: URGENT CARE | Facility: CLINIC | Age: 49
End: 2024-03-13
Payer: COMMERCIAL

## 2024-03-13 VITALS
WEIGHT: 198 LBS | SYSTOLIC BLOOD PRESSURE: 118 MMHG | HEART RATE: 61 BPM | RESPIRATION RATE: 14 BRPM | OXYGEN SATURATION: 98 % | BODY MASS INDEX: 29.33 KG/M2 | DIASTOLIC BLOOD PRESSURE: 68 MMHG | HEIGHT: 69 IN | TEMPERATURE: 97.5 F

## 2024-03-13 DIAGNOSIS — S61.212A LACERATION OF RIGHT MIDDLE FINGER WITHOUT FOREIGN BODY WITHOUT DAMAGE TO NAIL, INITIAL ENCOUNTER: ICD-10-CM

## 2024-03-13 PROCEDURE — 3074F SYST BP LT 130 MM HG: CPT | Performed by: NURSE PRACTITIONER

## 2024-03-13 PROCEDURE — 3078F DIAST BP <80 MM HG: CPT | Performed by: NURSE PRACTITIONER

## 2024-03-13 PROCEDURE — 12002 RPR S/N/AX/GEN/TRNK2.6-7.5CM: CPT | Mod: F7 | Performed by: NURSE PRACTITIONER

## 2024-03-13 NOTE — PROGRESS NOTES
Date: 03/13/24        Chief Complaint   Patient presents with    Finger Injury     Right hand middle finger cut today 03/13/2024 @ 2:00pm        History of Present Illness: 49 y.o.  male presents to clinic with a laceration to his right middle finger that happened today.  Patient states he cut it on a piece of metal outside of his house.  He is up-to-date on tetanus.  He denies any distal numbness or tingling.  Presents to clinic for evaluation.      ROS:    No severe shortness of breath   No Cardiac like chest pain, as discussed   As otherwise stated in HPI    Medical/SX/ Social History:  Reviewed per chart    Pertinent Medications:    Current Outpatient Medications on File Prior to Visit   Medication Sig Dispense Refill    amoxicillin (AMOXIL) 500 MG Cap Take 1 Capsule by mouth 2 times a day for 10 days. 20 Capsule 0    lidocaine (LIDODERM) 5 % Patch       tamsulosin (FLOMAX) 0.4 MG capsule Take 1 capsule by mouth 1/2 hour after breakfast. 90 capsule 1    naproxen (NAPROSYN) 500 MG Tab Take 1 tablet by mouth 2 times a day, with meals. 180 tablet 1     No current facility-administered medications on file prior to visit.        Allergies:    Patient has no known allergies.     Problem list, medications, and allergies reviewed by myself today in Epic     Physical Exam:    Vitals:    03/13/24 1539   BP: 118/68   Pulse: 61   Resp: 14   Temp: 36.4 °C (97.5 °F)   SpO2: 98%             Physical Exam  Constitutional:       Appearance: Normal appearance.   Musculoskeletal:        Hands:       Comments: Flexor and extensor tendons are intact.  Cap refill less than 3 seconds.  Range of motion intact no underlying bony tenderness.   Neurological:      Mental Status: He is alert.          LACERATION PROCEDURE NOTE:    Procedure explained and verbal consent obtained. The wound was anesthetized with  2 mL of lidocaine without epi, with good anesthesia. Sterile drape and prep were done. Copious irrigation was done with saline and  the wound was explored. Length of wound was visualized at approximately: 3 cm. There was no visualized foreign body or deep structure injury noted. Wound was examined under range of motion, range of motion intact. Wound edges were approximated with good alignment using sutures.  6 simple interrupted sutures were placed using Ethilon.  Nonstick dressing applied. Patient tolerated procedure well with no complications.           Medical Decision making and plan :  I personally reviewed prior external notes and test results pertinent to today's visit. Pt is clinically stable at today's acute urgent care visit.  Patient appears nontoxic with no acute distress noted. Appropriate for outpatient care at this time.      Pleasant 49 y.o. male presented clinic with laceration to right middle finger.  Exam findings are reassuring laceration closed as above using 6 simple interrupted sutures.  Patient did tolerate well.  Advised to return to clinic in 7 days for suture removal certainly sooner if signs or symptoms of infection occur as discussed.    1. Laceration of right middle finger without foreign body without damage to nail, initial encounter         Shared decision-making was utilized with patient for treatment plan. Medication discussed included indication for use and the potential benefits and side effects. Education was provided regarding the aforementioned assessments.  Differential Diagnosis, natural history, and supportive care discussed. All of the patient's questions were answered to their satisfaction at the time of discharge. Patient was encouraged to monitor symptoms closely. Those signs and symptoms which would warrant concern and mandate seeking a higher level of service through the emergency department discussed at length.  Patient stated agreement and understanding of this plan of care.    Disposition:  Home in stable condition       Voice Recognition Disclaimer:  Portions of this document were created using  voice recognition software. The software does have a chance of producing errors of grammar and possibly content. I have made every reasonable attempt to correct obvious errors, but there may be errors of grammar and possibly content that I did not discover before finalizing the documentation.    NED Smith.

## 2024-05-26 ENCOUNTER — OFFICE VISIT (OUTPATIENT)
Dept: URGENT CARE | Facility: CLINIC | Age: 49
End: 2024-05-26
Payer: COMMERCIAL

## 2024-05-26 VITALS
WEIGHT: 200 LBS | HEART RATE: 69 BPM | BODY MASS INDEX: 28.63 KG/M2 | DIASTOLIC BLOOD PRESSURE: 62 MMHG | SYSTOLIC BLOOD PRESSURE: 108 MMHG | OXYGEN SATURATION: 97 % | HEIGHT: 70 IN | RESPIRATION RATE: 19 BRPM | TEMPERATURE: 98.4 F

## 2024-05-26 DIAGNOSIS — M54.42 BILATERAL LOW BACK PAIN WITH BILATERAL SCIATICA, UNSPECIFIED CHRONICITY: ICD-10-CM

## 2024-05-26 DIAGNOSIS — M54.41 BILATERAL LOW BACK PAIN WITH BILATERAL SCIATICA, UNSPECIFIED CHRONICITY: ICD-10-CM

## 2024-05-26 PROCEDURE — 3078F DIAST BP <80 MM HG: CPT

## 2024-05-26 PROCEDURE — 3074F SYST BP LT 130 MM HG: CPT

## 2024-05-26 PROCEDURE — 99214 OFFICE O/P EST MOD 30 MIN: CPT

## 2024-05-26 RX ORDER — PREDNISONE 20 MG/1
40 TABLET ORAL DAILY
Qty: 10 TABLET | Refills: 0 | Status: SHIPPED | OUTPATIENT
Start: 2024-05-26 | End: 2024-05-26

## 2024-05-26 RX ORDER — CYCLOBENZAPRINE HCL 5 MG
5-10 TABLET ORAL 3 TIMES DAILY PRN
Qty: 20 TABLET | Refills: 0 | Status: SHIPPED | OUTPATIENT
Start: 2024-05-26

## 2024-05-26 RX ORDER — CYCLOBENZAPRINE HCL 5 MG
5-10 TABLET ORAL 3 TIMES DAILY PRN
Qty: 20 TABLET | Refills: 0 | Status: SHIPPED | OUTPATIENT
Start: 2024-05-26 | End: 2024-05-26

## 2024-05-26 RX ORDER — KETOROLAC TROMETHAMINE 30 MG/ML
30 INJECTION, SOLUTION INTRAMUSCULAR; INTRAVENOUS ONCE
Status: COMPLETED | OUTPATIENT
Start: 2024-05-26 | End: 2024-05-26

## 2024-05-26 RX ORDER — ACETAMINOPHEN 500 MG
500-1000 TABLET ORAL EVERY 6 HOURS PRN
COMMUNITY

## 2024-05-26 RX ORDER — PREDNISONE 20 MG/1
40 TABLET ORAL DAILY
Qty: 10 TABLET | Refills: 0 | Status: SHIPPED | OUTPATIENT
Start: 2024-05-26 | End: 2024-05-31

## 2024-05-26 RX ADMIN — KETOROLAC TROMETHAMINE 30 MG: 30 INJECTION, SOLUTION INTRAMUSCULAR; INTRAVENOUS at 17:12

## 2024-05-26 NOTE — PROGRESS NOTES
Subjective:   Carlos Manuel Cueto is a 49 y.o. male who presents for Back Problem (Patient is here today for back locking up. Patient states that it started about a week and a half ago. Patient states that he has seen the Chiropractor a few times recently. )      HPI: This is a 49-year-old male who presents today for low back pain.  This is an acute on chronic problem.  Patient reports developing low back pain to both sides of his low back 1.5 weeks ago.  Reports recent long car ride to [place] which he believes aggravated his chronic low back pain.  He reports that he has gone to the chiropractor and has taken naproxen without any improvement in his symptoms.  He does report radiating pain down his upper thighs.  He reports his pain is 7\10.  No saddle anesthesia.  No loss of control of bowel or bladder.  He does report history of chronic back pain.    Review of Systems   Musculoskeletal:  Positive for back pain.       Medications:    Current Outpatient Medications on File Prior to Visit   Medication Sig Dispense Refill    acetaminophen (TYLENOL) 500 MG Tab Take 500-1,000 mg by mouth every 6 hours as needed.      tamsulosin (FLOMAX) 0.4 MG capsule Take 1 capsule by mouth 1/2 hour after breakfast. 90 capsule 1    naproxen (NAPROSYN) 500 MG Tab Take 1 tablet by mouth 2 times a day, with meals. 180 tablet 1    lidocaine (LIDODERM) 5 % Patch        No current facility-administered medications on file prior to visit.        Allergies:   Patient has no known allergies.    Problem List:   Patient Active Problem List   Diagnosis    BPH (benign prostatic hyperplasia)    Chronic pain of right knee    Irritable bowel syndrome    Rosacea    Dry eye syndrome    Psoriasis    Chronic low back pain    Sleep apnea    Tinnitus    Chronic rhinitis        Surgical History:  Past Surgical History:   Procedure Laterality Date    EYE SURGERY Bilateral 2005    [facility]    DENTAL EXTRACTION(S)  1995    wisdom       Past Social Hx:   Social History  "    Tobacco Use    Smoking status: Former     Types: Cigars    Smokeless tobacco: Never    Tobacco comments:     cigar rarely   Vaping Use    Vaping status: Never Used   Substance Use Topics    Alcohol use: Yes     Comment: OCC     Drug use: Never          Problem list, medications, and allergies reviewed by myself today in Epic.     Objective:     /62   Pulse 69   Temp 36.9 °C (98.4 °F) (Temporal)   Resp 19   Ht 1.765 m (5' 9.5\")   Wt 90.7 kg (200 lb)   SpO2 97%   BMI 29.11 kg/m²     Physical Exam  Vitals and nursing note reviewed.   Constitutional:       General: He is not in acute distress.     Appearance: Normal appearance. He is normal weight. He is not ill-appearing, toxic-appearing or diaphoretic.   HENT:      Head: Normocephalic and atraumatic.   Cardiovascular:      Rate and Rhythm: Normal rate and regular rhythm.      Pulses: Normal pulses.      Heart sounds: Normal heart sounds. No murmur heard.     No friction rub. No gallop.   Pulmonary:      Effort: Pulmonary effort is normal. No respiratory distress.      Breath sounds: Normal breath sounds. No stridor. No wheezing, rhonchi or rales.   Chest:      Chest wall: No tenderness.   Musculoskeletal:      Cervical back: Normal range of motion and neck supple. No tenderness.      Lumbar back: Spasms and tenderness present. No swelling, edema, deformity, signs of trauma, lacerations or bony tenderness. Normal range of motion. Negative right straight leg raise test and negative left straight leg raise test. No scoliosis.        Back:    Lymphadenopathy:      Cervical: No cervical adenopathy.   Skin:     General: Skin is warm and dry.      Capillary Refill: Capillary refill takes less than 2 seconds.   Neurological:      General: No focal deficit present.      Mental Status: He is alert and oriented to person, place, and time. Mental status is at baseline.      Gait: Gait normal.   Psychiatric:         Mood and Affect: Mood normal.         Behavior: " Behavior normal.         Thought Content: Thought content normal.         Judgment: Judgment normal.         Assessment/Plan:     Diagnosis and associated orders:   1. Bilateral low back pain with bilateral sciatica, unspecified chronicity  ketorolac (Toradol) injection 30 mg    cyclobenzaprine (FLEXERIL) 5 mg tablet    predniSONE (DELTASONE) 20 MG Tab    DISCONTINUED: cyclobenzaprine (FLEXERIL) 5 mg tablet    DISCONTINUED: predniSONE (DELTASONE) 20 MG Tab             Comments/MDM:   Pt is clinically stable at today's acute urgent care visit.  No acute distress noted. Appropriate for outpatient management at this time.     Acute on chronic problem  Flexeril as prescribed  Prednisone as prescribed  Advised the patient to take Tylenol per  label instructions  Discussed alternating heat and ice application  Perform gentle stretches  Advised the patient to avoid any sitting, standing, or laying for long periods of time  He is to return for any new worsening signs or symptoms or worsens or fail to improve           Discussed DDx, management options (risks,benefits, and alternatives to planned treatment), natural progression and supportive care.  Expressed understanding and the treatment plan was agreed upon. Questions were encouraged and answered   Return to urgent care prn if new or worsening sx or if there is no improvement in condition prn.    Educated in Red flags and indications to immediately call 911 or present to the Emergency Department.   Advised the patient to follow-up with the primary care physician for recheck, reevaluation, and consideration of further management.    I personally reviewed prior external notes and test results pertinent to today's visit.  I have independently reviewed and interpreted all diagnostics ordered during this urgent care acute visit.     Please note that this dictation was created using voice recognition software. I have made a reasonable attempt to correct obvious  errors, but I expect that there are errors of grammar and possibly content that I did not discover before finalizing the note.    This note was electronically signed by VANDANA Hernandez

## 2024-05-29 ASSESSMENT — ENCOUNTER SYMPTOMS: BACK PAIN: 1
